# Patient Record
Sex: FEMALE | Race: OTHER | Employment: FULL TIME | URBAN - METROPOLITAN AREA
[De-identification: names, ages, dates, MRNs, and addresses within clinical notes are randomized per-mention and may not be internally consistent; named-entity substitution may affect disease eponyms.]

---

## 2024-11-20 SDOH — ECONOMIC STABILITY: FOOD INSECURITY: WITHIN THE PAST 12 MONTHS, YOU WORRIED THAT YOUR FOOD WOULD RUN OUT BEFORE YOU GOT MONEY TO BUY MORE.: NEVER TRUE

## 2024-11-20 SDOH — ECONOMIC STABILITY: FOOD INSECURITY: WITHIN THE PAST 12 MONTHS, THE FOOD YOU BOUGHT JUST DIDN'T LAST AND YOU DIDN'T HAVE MONEY TO GET MORE.: NEVER TRUE

## 2024-11-20 SDOH — ECONOMIC STABILITY: INCOME INSECURITY: HOW HARD IS IT FOR YOU TO PAY FOR THE VERY BASICS LIKE FOOD, HOUSING, MEDICAL CARE, AND HEATING?: NOT HARD AT ALL

## 2024-11-20 SDOH — ECONOMIC STABILITY: TRANSPORTATION INSECURITY
IN THE PAST 12 MONTHS, HAS LACK OF TRANSPORTATION KEPT YOU FROM MEETINGS, WORK, OR FROM GETTING THINGS NEEDED FOR DAILY LIVING?: NO

## 2024-11-20 ASSESSMENT — PATIENT HEALTH QUESTIONNAIRE - PHQ9
SUM OF ALL RESPONSES TO PHQ9 QUESTIONS 1 & 2: 0
2. FEELING DOWN, DEPRESSED OR HOPELESS: NOT AT ALL
2. FEELING DOWN, DEPRESSED OR HOPELESS: NOT AT ALL
1. LITTLE INTEREST OR PLEASURE IN DOING THINGS: NOT AT ALL
SUM OF ALL RESPONSES TO PHQ QUESTIONS 1-9: 0
SUM OF ALL RESPONSES TO PHQ9 QUESTIONS 1 & 2: 0
SUM OF ALL RESPONSES TO PHQ QUESTIONS 1-9: 0
1. LITTLE INTEREST OR PLEASURE IN DOING THINGS: NOT AT ALL

## 2024-11-21 ENCOUNTER — HOSPITAL ENCOUNTER (OUTPATIENT)
Dept: LAB | Age: 29
Discharge: HOME | End: 2024-11-21
Payer: COMMERCIAL

## 2024-11-21 ENCOUNTER — OFFICE VISIT (OUTPATIENT)
Dept: OBGYN CLINIC | Age: 29
End: 2024-11-21
Payer: COMMERCIAL

## 2024-11-21 VITALS
DIASTOLIC BLOOD PRESSURE: 60 MMHG | BODY MASS INDEX: 21.35 KG/M2 | HEART RATE: 84 BPM | WEIGHT: 116 LBS | SYSTOLIC BLOOD PRESSURE: 102 MMHG | HEIGHT: 62 IN

## 2024-11-21 DIAGNOSIS — Z32.01 POSITIVE URINE PREGNANCY TEST: ICD-10-CM

## 2024-11-21 DIAGNOSIS — E71.42: ICD-10-CM

## 2024-11-21 DIAGNOSIS — N91.1 SECONDARY AMENORRHEA: Primary | ICD-10-CM

## 2024-11-21 DIAGNOSIS — N91.1 SECONDARY AMENORRHEA: ICD-10-CM

## 2024-11-21 LAB
ALBUMIN SERPL-MCNC: 4.3 G/DL (ref 3.5–4.6)
ALP SERPL-CCNC: 50 U/L (ref 40–130)
ALT SERPL-CCNC: 9 U/L (ref 0–33)
ANION GAP SERPL CALCULATED.3IONS-SCNC: 9 MEQ/L (ref 9–15)
AST SERPL-CCNC: 11 U/L (ref 0–35)
BASOPHILS # BLD: 0 K/UL (ref 0–0.2)
BASOPHILS NFR BLD: 0.3 %
BILIRUB SERPL-MCNC: 0.3 MG/DL (ref 0.2–0.7)
BILIRUB UR QL STRIP: NEGATIVE
BUN SERPL-MCNC: 8 MG/DL (ref 6–20)
CALCIUM SERPL-MCNC: 9.3 MG/DL (ref 8.5–9.9)
CHLORIDE SERPL-SCNC: 103 MEQ/L (ref 95–107)
CLARITY UR: CLEAR
CO2 SERPL-SCNC: 26 MEQ/L (ref 20–31)
COLOR UR: YELLOW
CREAT SERPL-MCNC: 0.37 MG/DL (ref 0.5–0.9)
EOSINOPHIL # BLD: 0.1 K/UL (ref 0–0.7)
EOSINOPHIL NFR BLD: 0.7 %
ERYTHROCYTE [DISTWIDTH] IN BLOOD BY AUTOMATED COUNT: 12.7 % (ref 11.5–14.5)
GLOBULIN SER CALC-MCNC: 2.7 G/DL (ref 2.3–3.5)
GLUCOSE SERPL-MCNC: 79 MG/DL (ref 70–99)
GLUCOSE UR STRIP-MCNC: NEGATIVE MG/DL
HBV SURFACE AG SERPL QL IA: NORMAL
HCT VFR BLD AUTO: 34 % (ref 37–47)
HGB BLD-MCNC: 11.4 G/DL (ref 12–16)
HGB UR QL STRIP: NEGATIVE
KETONES UR STRIP-MCNC: NEGATIVE MG/DL
LEUKOCYTE ESTERASE UR QL STRIP: NEGATIVE
LYMPHOCYTES # BLD: 1.7 K/UL (ref 1–4.8)
LYMPHOCYTES NFR BLD: 24.9 %
MCH RBC QN AUTO: 31 PG (ref 27–31.3)
MCHC RBC AUTO-ENTMCNC: 33.5 % (ref 33–37)
MCV RBC AUTO: 92.4 FL (ref 79.4–94.8)
MONOCYTES # BLD: 0.4 K/UL (ref 0.2–0.8)
MONOCYTES NFR BLD: 5.5 %
NEUTROPHILS # BLD: 4.6 K/UL (ref 1.4–6.5)
NEUTS SEG NFR BLD: 68.2 %
NITRITE UR QL STRIP: NEGATIVE
PH UR STRIP: 7.5 [PH] (ref 5–9)
PLATELET # BLD AUTO: 314 K/UL (ref 130–400)
POTASSIUM SERPL-SCNC: 4.2 MEQ/L (ref 3.4–4.9)
PROT SERPL-MCNC: 7 G/DL (ref 6.3–8)
PROT UR STRIP-MCNC: NEGATIVE MG/DL
RBC # BLD AUTO: 3.68 M/UL (ref 4.2–5.4)
RUBELLA ANTIBODY IGG: 199.9 IU/ML
SODIUM SERPL-SCNC: 138 MEQ/L (ref 135–144)
SP GR UR STRIP: 1.02 (ref 1–1.03)
UROBILINOGEN UR STRIP-ACNC: 0.2 E.U./DL
WBC # BLD AUTO: 6.7 K/UL (ref 4.8–10.8)

## 2024-11-21 PROCEDURE — G8427 DOCREV CUR MEDS BY ELIG CLIN: HCPCS | Performed by: OBSTETRICS & GYNECOLOGY

## 2024-11-21 PROCEDURE — 80307 DRUG TEST PRSMV CHEM ANLYZR: CPT

## 2024-11-21 PROCEDURE — G8484 FLU IMMUNIZE NO ADMIN: HCPCS | Performed by: OBSTETRICS & GYNECOLOGY

## 2024-11-21 PROCEDURE — 1036F TOBACCO NON-USER: CPT | Performed by: OBSTETRICS & GYNECOLOGY

## 2024-11-21 PROCEDURE — 81025 URINE PREGNANCY TEST: CPT | Performed by: OBSTETRICS & GYNECOLOGY

## 2024-11-21 PROCEDURE — 81003 URINALYSIS AUTO W/O SCOPE: CPT

## 2024-11-21 PROCEDURE — 87808 TRICHOMONAS ASSAY W/OPTIC: CPT

## 2024-11-21 PROCEDURE — G8420 CALC BMI NORM PARAMETERS: HCPCS | Performed by: OBSTETRICS & GYNECOLOGY

## 2024-11-21 PROCEDURE — 99214 OFFICE O/P EST MOD 30 MIN: CPT | Performed by: OBSTETRICS & GYNECOLOGY

## 2024-11-21 PROCEDURE — 87086 URINE CULTURE/COLONY COUNT: CPT

## 2024-11-21 PROCEDURE — 87210 SMEAR WET MOUNT SALINE/INK: CPT

## 2024-11-21 RX ORDER — DOCUSATE SODIUM 100 MG/1
100 CAPSULE, LIQUID FILLED ORAL 2 TIMES DAILY
Qty: 60 CAPSULE | Refills: 4 | Status: SHIPPED | OUTPATIENT
Start: 2024-11-21

## 2024-11-21 ASSESSMENT — ENCOUNTER SYMPTOMS
NAUSEA: 0
ABDOMINAL DISTENTION: 0
ABDOMINAL PAIN: 0
BLOOD IN STOOL: 0
CONSTIPATION: 1
SHORTNESS OF BREATH: 0
WHEEZING: 0
SORE THROAT: 0
COUGH: 0
VOMITING: 0
DIARRHEA: 0

## 2024-11-21 NOTE — PROGRESS NOTES
Subjective:      Patient ID:  Alondra Myers is a 29 y.o. female with chief complaint of:  Chief Complaint   Patient presents with    Amenorrhea     LMP approximately 9/21/24.       Patient presents today to establish care positive pregnancy test at home last menstrual period September 21.  Patient has 2 previous vaginal deliveries without complication she has a known history of carnitine deficiency and she is taking supplement.        Past Medical History:   Diagnosis Date    Carnitine deficiency (HCC)      No past surgical history on file.  Family History   Problem Relation Age of Onset    Asthma Mother     Hypertension Father     Diabetes Maternal Grandmother     Hypertension Maternal Grandmother     Diabetes Paternal Grandmother      Current Outpatient Medications on File Prior to Visit   Medication Sig Dispense Refill    levOCARNitine (CARNITOR) 330 MG tablet TAKE 1 TABLET BY MOUTH TWICE A DAY      Prenatal Vit-DSS-Fe Cbn-FA (PRENATAL AD PO) Take by mouth       No current facility-administered medications on file prior to visit.     Allergies:  Patient has no known allergies.    Review of Systems   Constitutional:  Negative for activity change, appetite change, fatigue and unexpected weight change.   HENT:  Negative for nosebleeds and sore throat.    Eyes:  Negative for visual disturbance.   Respiratory:  Negative for cough, shortness of breath and wheezing.    Cardiovascular:  Negative for chest pain, palpitations and leg swelling.   Gastrointestinal:  Positive for constipation. Negative for abdominal distention, abdominal pain, blood in stool, diarrhea, nausea and vomiting.   Endocrine: Negative for cold intolerance, heat intolerance, polydipsia and polyuria.   Genitourinary:  Negative for difficulty urinating, dyspareunia, dysuria, frequency, genital sores, hematuria, menstrual problem, pelvic pain, urgency, vaginal bleeding, vaginal discharge and vaginal pain.   Musculoskeletal:  Negative for arthralgias.

## 2024-11-22 LAB
ABO/RH: NORMAL
ANTIBODY SCREEN: NORMAL
CLUE CELLS VAG QL WET PREP: NORMAL
HEPATITIS C ANTIBODY: NONREACTIVE
HIV AG/AB: NONREACTIVE
HPV16+18+H RISK 12 DNA SPEC-IMP: NORMAL
RPR SER QL: NORMAL
T VAGINALIS VAG QL WET PREP: NORMAL
TRICHOMONAS VAGINALIS SCREEN: NEGATIVE
YEAST VAG QL WET PREP: NORMAL

## 2024-11-23 LAB
BACTERIA UR CULT: NORMAL
HSV1 GG IGG SER-ACNC: 33.2 IV
HSV2 GG IGG SER-ACNC: 0.1 IV
VZV IGG SER IA-ACNC: 3.2 S/CO

## 2024-11-24 LAB
ACYLCARNITINE SERPL-SCNC: 7 UMOL/L (ref 5–29)
CARN ESTERS/C0 SERPL-SRTO: 0.3 {RATIO} (ref 0.1–1)
CARNITINE FREE SERPL-SCNC: 21 UMOL/L (ref 25–60)
CARNITINE SERPL-SCNC: 28 UMOL/L (ref 34–86)

## 2024-11-25 LAB
C TRACH DNA CVX QL NAA+PROBE: NEGATIVE
N GONORRHOEA DNA CERV MUCUS QL NAA+PROBE: NEGATIVE

## 2024-11-27 LAB
6MAM UR QL: NOT DETECTED
7-AMINOCLONAZEPAM: NOT DETECTED
ALPHA-OH-ALPRAZOLAM: NOT DETECTED
ALPHA-OH-MIDAZOLAM, URINE: NOT DETECTED
ALPRAZOLAM: NOT DETECTED
AMPHET UR QL SCN: NOT DETECTED
BARBITURATES: NEGATIVE
BENZOYLECGONINE: NEGATIVE
BUPRENORPHINE: NOT DETECTED
CARISOPRODOL UR QL: NEGATIVE
CLONAZEPAM UR QL: NOT DETECTED
CODEINE: NOT DETECTED
CREAT UR-MCNC: 115 MG/DL (ref 20–400)
DIAZEPAM: NOT DETECTED
ETHYL GLUCURONIDE: NEGATIVE
FENTANYL UR QL: NOT DETECTED
GABAPENTIN: NOT DETECTED
HPV HR 12 DNA SPEC QL NAA+PROBE: NOT DETECTED
HPV16 DNA SPEC QL NAA+PROBE: NOT DETECTED
HPV16+18+H RISK 12 DNA SPEC-IMP: NORMAL
HPV18 DNA SPEC QL NAA+PROBE: NOT DETECTED
HYDROCODONE UR QL: NOT DETECTED
HYDROMORPHONE: NOT DETECTED
LORAZEPAM UR QL: NOT DETECTED
MARIJUANA METABOLITE: NEGATIVE
MDA: NOT DETECTED
MDEA: NOT DETECTED
MDMA UR QL: NOT DETECTED
MEPERIDINE: NOT DETECTED
METHADONE: NEGATIVE
METHAMPHETAMINE: NOT DETECTED
METHYLPHENIDATE: NOT DETECTED
MIDAZOLAM UR QL SCN: NOT DETECTED
MORPHINE: NOT DETECTED
NALOXONE: NOT DETECTED
NORBUPRENORPHINE, FREE: NOT DETECTED
NORDIAZEPAM: NOT DETECTED
NORFENTANYL: NOT DETECTED
NORHYDROCODONE, URINE: NOT DETECTED
NOROXYCODONE: NOT DETECTED
NOROXYMORPHONE, URINE: NOT DETECTED
OXAZEPAM UR QL: NOT DETECTED
OXYCODONE UR QL: NOT DETECTED
OXYMORPHONE UR QL: NOT DETECTED
PAIN MANAGEMENT DRUG PANEL: NORMAL
PATHOLOGY STUDY: NORMAL
PCP: NEGATIVE
PHENTERMINE: NOT DETECTED
PREGABALIN: NOT DETECTED
TAPENTADOL, URINE: NOT DETECTED
TAPENTADOL-O-SULFATE, URINE: NOT DETECTED
TEMAZEPAM: NOT DETECTED
TRAMADOL: NEGATIVE
ZOLPIDEM: NOT DETECTED

## 2024-11-29 ENCOUNTER — TELEPHONE (OUTPATIENT)
Dept: OBGYN CLINIC | Age: 29
End: 2024-11-29

## 2024-11-29 NOTE — TELEPHONE ENCOUNTER
----- Message from Dr. Parish Woodward DO sent at 11/25/2024  2:55 PM EST -----  Please have pt share with her doctor so they may adjust her meds  ----- Message -----  From: Valentino Rodriguez Incoming Lab Results From [facility]  Sent: 11/21/2024   3:32 PM EST  To: Parish Woodward DO

## 2024-11-29 NOTE — TELEPHONE ENCOUNTER
LMOM informing patient to contact her PCP in regards to her labs due to medication may need to be adjusted.

## 2024-12-02 ENCOUNTER — HOSPITAL ENCOUNTER (OUTPATIENT)
Dept: ULTRASOUND IMAGING | Age: 29
Discharge: HOME | End: 2024-12-04
Payer: COMMERCIAL

## 2024-12-02 DIAGNOSIS — N91.1 SECONDARY AMENORRHEA: ICD-10-CM

## 2024-12-02 DIAGNOSIS — Z32.01 POSITIVE URINE PREGNANCY TEST: ICD-10-CM

## 2024-12-02 PROCEDURE — 76801 OB US < 14 WKS SINGLE FETUS: CPT

## 2024-12-02 PROCEDURE — 76817 TRANSVAGINAL US OBSTETRIC: CPT

## 2024-12-18 ENCOUNTER — INITIAL PRENATAL (OUTPATIENT)
Dept: OBGYN CLINIC | Age: 29
End: 2024-12-18
Payer: COMMERCIAL

## 2024-12-18 VITALS
SYSTOLIC BLOOD PRESSURE: 102 MMHG | BODY MASS INDEX: 21.4 KG/M2 | WEIGHT: 117 LBS | DIASTOLIC BLOOD PRESSURE: 62 MMHG | HEART RATE: 92 BPM

## 2024-12-18 DIAGNOSIS — Z3A.12 12 WEEKS GESTATION OF PREGNANCY: ICD-10-CM

## 2024-12-18 DIAGNOSIS — E71.40 CARNITINE DEFICIENCY (HCC): ICD-10-CM

## 2024-12-18 DIAGNOSIS — Z34.91 PRENATAL CARE, FIRST TRIMESTER: Primary | ICD-10-CM

## 2024-12-18 DIAGNOSIS — Z34.82 ENCOUNTER FOR SUPERVISION OF OTHER NORMAL PREGNANCY, SECOND TRIMESTER: ICD-10-CM

## 2024-12-18 PROCEDURE — G8484 FLU IMMUNIZE NO ADMIN: HCPCS | Performed by: OBSTETRICS & GYNECOLOGY

## 2024-12-18 PROCEDURE — 1036F TOBACCO NON-USER: CPT | Performed by: OBSTETRICS & GYNECOLOGY

## 2024-12-18 PROCEDURE — G8420 CALC BMI NORM PARAMETERS: HCPCS | Performed by: OBSTETRICS & GYNECOLOGY

## 2024-12-18 PROCEDURE — 99213 OFFICE O/P EST LOW 20 MIN: CPT | Performed by: OBSTETRICS & GYNECOLOGY

## 2024-12-18 PROCEDURE — G8427 DOCREV CUR MEDS BY ELIG CLIN: HCPCS | Performed by: OBSTETRICS & GYNECOLOGY

## 2024-12-18 NOTE — PROGRESS NOTES
using tobacco. Morbidity, mortality, and cessation programs were reviewed. The risks include but are not limited to increased risks of  labor,  delivery, premature rupture of membranes, intrauterine growth restriction, intrauterine fetal demise and abruptio placenta. Secondary smoke risks were also reviewed. Increases in cancer, respiratory problems, and sudden infant death syndrome were reviewed as well.    The patient was informed of a 2-4% risk of congenital anomalies in the general population. She was also informed that karyotyping is the only way to evaluate the fetus for genetic problems and genetic lethal anomalies. Chorionic villous sampling, amniocentesis and Maternal Genetic Blood Sampling-(NIPT Testing) were also discussed with morbidity rates in detail. She requested any of the options.    Route of delivery and counseling on vaginal, operative vaginal, and  sections were completed with the risks of each to both the patient as well as her baby. The possibility of a blood transfusion was discussed as well. The patient was not opposed to receiving a transfusion if needed.    First trimester screening and MSAFP single marker testing was reviewed in detail with attention to timing of testing and their windows.A second trimester amniocentesis with MFM consults is also made available to the patient with significant risks. Risks, Benefits and non-invasive alternative testing was reviewed.     The patient was questioned in detail regarding any genetic misnomer history, chromosomal abnormalities, or learning disabilities in  herself, the father of the baby or their families. SHE DENIED ANY HISTORY AS STATED ABOVE: Yes    Upon completion of the visit all questions were answered and the patients follow-up and testing schedule were reviewed.  Prenatal vitamins were given if necessary.  What to expect from visits and care discussed     Initial labs reviewed  Prenatal vitamins.  Problem list

## 2025-01-20 SDOH — ECONOMIC STABILITY: TRANSPORTATION INSECURITY
IN THE PAST 12 MONTHS, HAS THE LACK OF TRANSPORTATION KEPT YOU FROM MEDICAL APPOINTMENTS OR FROM GETTING MEDICATIONS?: NO

## 2025-01-20 SDOH — ECONOMIC STABILITY: INCOME INSECURITY: IN THE LAST 12 MONTHS, WAS THERE A TIME WHEN YOU WERE NOT ABLE TO PAY THE MORTGAGE OR RENT ON TIME?: NO

## 2025-01-20 SDOH — ECONOMIC STABILITY: FOOD INSECURITY: WITHIN THE PAST 12 MONTHS, THE FOOD YOU BOUGHT JUST DIDN'T LAST AND YOU DIDN'T HAVE MONEY TO GET MORE.: NEVER TRUE

## 2025-01-20 SDOH — ECONOMIC STABILITY: FOOD INSECURITY: WITHIN THE PAST 12 MONTHS, YOU WORRIED THAT YOUR FOOD WOULD RUN OUT BEFORE YOU GOT MONEY TO BUY MORE.: NEVER TRUE

## 2025-01-23 ENCOUNTER — ROUTINE PRENATAL (OUTPATIENT)
Dept: OBGYN CLINIC | Age: 30
End: 2025-01-23

## 2025-01-23 VITALS
SYSTOLIC BLOOD PRESSURE: 104 MMHG | HEIGHT: 62 IN | BODY MASS INDEX: 22.63 KG/M2 | WEIGHT: 123 LBS | DIASTOLIC BLOOD PRESSURE: 72 MMHG

## 2025-01-23 DIAGNOSIS — Z34.80 SUPERVISION OF OTHER NORMAL PREGNANCY, ANTEPARTUM: Primary | ICD-10-CM

## 2025-01-23 DIAGNOSIS — Z3A.17 17 WEEKS GESTATION OF PREGNANCY: ICD-10-CM

## 2025-01-23 DIAGNOSIS — E71.40 CARNITINE DEFICIENCY (HCC): ICD-10-CM

## 2025-01-23 ASSESSMENT — PATIENT HEALTH QUESTIONNAIRE - PHQ9
SUM OF ALL RESPONSES TO PHQ QUESTIONS 1-9: 0
SUM OF ALL RESPONSES TO PHQ9 QUESTIONS 1 & 2: 0
SUM OF ALL RESPONSES TO PHQ QUESTIONS 1-9: 0
SUM OF ALL RESPONSES TO PHQ QUESTIONS 1-9: 0
1. LITTLE INTEREST OR PLEASURE IN DOING THINGS: NOT AT ALL
SUM OF ALL RESPONSES TO PHQ QUESTIONS 1-9: 0
2. FEELING DOWN, DEPRESSED OR HOPELESS: NOT AT ALL

## 2025-01-23 NOTE — PROGRESS NOTES
Patient's last menstrual period was 09/21/2024 (approximate).  Estimated Date of Delivery: 6/28/25   Please reference prenatal and OB flow chart for further information  PT here today for routine prenatal care  Pt endorses fetal movement and denies loss of fluid, contractions or vaginal bleeding  Pt without complaints  ROS:  Pt denies headache, dysuria, nausea/vomiting  PT denies chest pain or SOB  PE:  /72   Ht 1.575 m (5' 2\")   Wt 55.8 kg (123 lb)   LMP 09/21/2024 (Approximate)   Breastfeeding No   BMI 22.50 kg/m²   Gen - Alert and oriented x 3  HEENT- NC/AT, CVS - RRR, Lungs - CTAB  Abd - FH Appropriate fetal growth  LE no edema  Reassuring fetal status at this time     Diagnosis Orders   1. Supervision of other normal pregnancy, antepartum        2. 17 weeks gestation of pregnancy        3. Carnitine deficiency (HCC)            Upon completion of the visit all questions were answered and the patients follow-up and testing schedule were reviewed.  Pt recommended to continue pnvit and iron if ordered along with other supporting meds  Spent 20-25 min total time with pt

## 2025-02-10 ENCOUNTER — HOSPITAL ENCOUNTER (OUTPATIENT)
Dept: ULTRASOUND IMAGING | Age: 30
Discharge: HOME | End: 2025-02-12
Payer: COMMERCIAL

## 2025-02-10 DIAGNOSIS — Z3A.17 17 WEEKS GESTATION OF PREGNANCY: ICD-10-CM

## 2025-02-10 DIAGNOSIS — Z34.80 SUPERVISION OF OTHER NORMAL PREGNANCY, ANTEPARTUM: ICD-10-CM

## 2025-02-10 PROCEDURE — 76805 OB US >/= 14 WKS SNGL FETUS: CPT

## 2025-02-10 PROCEDURE — 76817 TRANSVAGINAL US OBSTETRIC: CPT

## 2025-02-20 ENCOUNTER — ROUTINE PRENATAL (OUTPATIENT)
Dept: OBGYN CLINIC | Age: 30
End: 2025-02-20
Payer: COMMERCIAL

## 2025-02-20 VITALS — BODY MASS INDEX: 23.78 KG/M2 | DIASTOLIC BLOOD PRESSURE: 72 MMHG | SYSTOLIC BLOOD PRESSURE: 106 MMHG | WEIGHT: 130 LBS

## 2025-02-20 DIAGNOSIS — Z3A.21 21 WEEKS GESTATION OF PREGNANCY: ICD-10-CM

## 2025-02-20 DIAGNOSIS — Z34.80 SUPERVISION OF OTHER NORMAL PREGNANCY, ANTEPARTUM: Primary | ICD-10-CM

## 2025-02-20 DIAGNOSIS — E71.40 CARNITINE DEFICIENCY: ICD-10-CM

## 2025-02-20 PROCEDURE — 1036F TOBACCO NON-USER: CPT | Performed by: OBSTETRICS & GYNECOLOGY

## 2025-02-20 PROCEDURE — G8420 CALC BMI NORM PARAMETERS: HCPCS | Performed by: OBSTETRICS & GYNECOLOGY

## 2025-02-20 PROCEDURE — 99213 OFFICE O/P EST LOW 20 MIN: CPT | Performed by: OBSTETRICS & GYNECOLOGY

## 2025-02-20 PROCEDURE — G8427 DOCREV CUR MEDS BY ELIG CLIN: HCPCS | Performed by: OBSTETRICS & GYNECOLOGY

## 2025-02-20 NOTE — PROGRESS NOTES
Patient's last menstrual period was 09/21/2024 (approximate).  Estimated Date of Delivery: 6/28/25   Please reference prenatal and OB flow chart for further information  PT here today for routine prenatal care  Pt endorses fetal movement and denies loss of fluid, contractions or vaginal bleeding  Pt without complaints  ROS:  Pt denies headache, dysuria, nausea/vomiting  PT denies chest pain or SOB  PE:  /72   Wt 59 kg (130 lb)   LMP 09/21/2024 (Approximate)   BMI 23.78 kg/m²   Gen - Alert and oriented x 3  HEENT- NC/AT, CVS - RRR, Lungs - CTAB  Abd - FH Appropriate fetal growth  LE no edema  Reassuring fetal status at this time     Diagnosis Orders   1. Supervision of other normal pregnancy, antepartum        2. 21 weeks gestation of pregnancy        3. Carnitine deficiency            Upon completion of the visit all questions were answered and the patients follow-up and testing schedule were reviewed.  Pt recommended to continue pnvit and iron if ordered along with other supporting meds  Spent 20-25 min total time with pt

## 2025-03-11 ENCOUNTER — TELEPHONE (OUTPATIENT)
Dept: OBGYN CLINIC | Age: 30
End: 2025-03-11

## 2025-03-12 ENCOUNTER — OFFICE VISIT (OUTPATIENT)
Dept: FAMILY MEDICINE CLINIC | Age: 30
End: 2025-03-12
Payer: COMMERCIAL

## 2025-03-12 VITALS
BODY MASS INDEX: 23.92 KG/M2 | HEIGHT: 62 IN | WEIGHT: 130 LBS | TEMPERATURE: 99.5 F | OXYGEN SATURATION: 99 % | HEART RATE: 104 BPM

## 2025-03-12 DIAGNOSIS — R11.0 NAUSEA: ICD-10-CM

## 2025-03-12 DIAGNOSIS — R52 GENERALIZED BODY ACHES: ICD-10-CM

## 2025-03-12 DIAGNOSIS — R53.83 FATIGUE, UNSPECIFIED TYPE: ICD-10-CM

## 2025-03-12 DIAGNOSIS — J02.9 ACUTE PHARYNGITIS, UNSPECIFIED ETIOLOGY: ICD-10-CM

## 2025-03-12 DIAGNOSIS — R50.9 FEVER, UNSPECIFIED FEVER CAUSE: ICD-10-CM

## 2025-03-12 DIAGNOSIS — J01.40 ACUTE NON-RECURRENT PANSINUSITIS: Primary | ICD-10-CM

## 2025-03-12 LAB
INFLUENZA A ANTIBODY: NORMAL
INFLUENZA B ANTIBODY: NORMAL
Lab: NORMAL
PERFORMING INSTRUMENT: NORMAL
QC PASS/FAIL: NORMAL
SARS-COV-2, POC: NORMAL

## 2025-03-12 PROCEDURE — 87804 INFLUENZA ASSAY W/OPTIC: CPT | Performed by: NURSE PRACTITIONER

## 2025-03-12 PROCEDURE — G8427 DOCREV CUR MEDS BY ELIG CLIN: HCPCS | Performed by: NURSE PRACTITIONER

## 2025-03-12 PROCEDURE — 99213 OFFICE O/P EST LOW 20 MIN: CPT | Performed by: NURSE PRACTITIONER

## 2025-03-12 PROCEDURE — 1036F TOBACCO NON-USER: CPT | Performed by: NURSE PRACTITIONER

## 2025-03-12 PROCEDURE — 87426 SARSCOV CORONAVIRUS AG IA: CPT | Performed by: NURSE PRACTITIONER

## 2025-03-12 PROCEDURE — G8420 CALC BMI NORM PARAMETERS: HCPCS | Performed by: NURSE PRACTITIONER

## 2025-03-12 RX ORDER — ONDANSETRON 4 MG/1
4 TABLET, ORALLY DISINTEGRATING ORAL
Qty: 21 TABLET | Refills: 0 | Status: SHIPPED | OUTPATIENT
Start: 2025-03-12

## 2025-03-12 RX ORDER — AMOXICILLIN 875 MG/1
875 TABLET, COATED ORAL 2 TIMES DAILY
Qty: 14 TABLET | Refills: 0 | Status: SHIPPED | OUTPATIENT
Start: 2025-03-12 | End: 2025-03-19

## 2025-03-12 ASSESSMENT — ENCOUNTER SYMPTOMS
ABDOMINAL PAIN: 0
NAUSEA: 1
DIARRHEA: 0
CHEST TIGHTNESS: 0
RHINORRHEA: 1
COUGH: 1
SORE THROAT: 1
SHORTNESS OF BREATH: 0

## 2025-03-12 NOTE — PROGRESS NOTES
Subjective  CARLA FLYNN TOM, 29 y.o. female presents today with:  Chief Complaint   Patient presents with    Cold Symptoms     States sore throat, headache, a lot of congestion and ear pain, eye pain, weak, nauseous, coughing with a phlegm, fever 101.5 and body aches since Saturday. Last tylenol taken 3:00am       HPI  Presents to walk-in clinic for acute illness   Symptoms began Saturday   Fever began Monday evening continued last night. Took Tylenol last around 03:00  C/o sore throat, headache, nasal congestion, otalgia, cough & body aches   Green sputum nasal/sinus   Feeling dizzy/lightheaded   Fatigued   Nausea w/o emesis   Denies diarrhea   Low appetite   Hydrating   Sleep interrupted   Cepacol lozenge   Currently pregnant   No other ill contacts in household                   Past Medical History:   Diagnosis Date    Carnitine deficiency       No past surgical history on file.  Family History   Problem Relation Age of Onset    Asthma Mother     Hypertension Father     Diabetes Maternal Grandmother     Hypertension Maternal Grandmother     Diabetes Paternal Grandmother            Review of Systems   Constitutional:  Positive for activity change, appetite change, chills, diaphoresis, fatigue and fever.   HENT:  Positive for congestion, ear pain, rhinorrhea and sore throat. Negative for ear discharge.    Respiratory:  Positive for cough. Negative for chest tightness and shortness of breath.    Gastrointestinal:  Positive for nausea. Negative for abdominal pain and diarrhea.   Musculoskeletal:  Positive for myalgias.   Neurological:  Positive for dizziness, light-headedness and headaches.   Psychiatric/Behavioral:  Positive for sleep disturbance.            PMH, Surgical Hx, Family Hx, and Social Hx reviewed and updated.  Health Maintenance reviewed.            Objective  Vitals:    03/12/25 1102   Pulse: (!) 104   Temp: 99.5 °F (37.5 °C)   SpO2: 99%   Weight: 59 kg (130 lb)   Height: 1.575 m (5' 2\")

## 2025-03-21 ENCOUNTER — ROUTINE PRENATAL (OUTPATIENT)
Dept: OBGYN CLINIC | Age: 30
End: 2025-03-21

## 2025-03-21 VITALS — WEIGHT: 130 LBS | SYSTOLIC BLOOD PRESSURE: 108 MMHG | BODY MASS INDEX: 23.78 KG/M2 | DIASTOLIC BLOOD PRESSURE: 68 MMHG

## 2025-03-21 DIAGNOSIS — Z3A.25 25 WEEKS GESTATION OF PREGNANCY: ICD-10-CM

## 2025-03-21 DIAGNOSIS — Z34.80 SUPERVISION OF OTHER NORMAL PREGNANCY, ANTEPARTUM: Primary | ICD-10-CM

## 2025-03-21 NOTE — PROGRESS NOTES
Patient's last menstrual period was 09/21/2024 (approximate).  Estimated Date of Delivery: 6/28/25   Please reference prenatal and OB flow chart for further information  PT here today for routine prenatal care  Pt endorses fetal movement and denies loss of fluid, contractions or vaginal bleeding  Pt without complaints  ROS:  Pt denies headache, dysuria, nausea/vomiting  PT denies chest pain or SOB  PE:  /68   Wt 59 kg (130 lb)   LMP 09/21/2024 (Approximate)   BMI 23.78 kg/m²   Gen - Alert and oriented x 3  HEENT- NC/AT, CVS - RRR, Lungs - CTAB  Abd - FH Appropriate fetal growth  LE no edema  Reassuring fetal status at this time     Diagnosis Orders   1. Supervision of other normal pregnancy, antepartum  Glucose Tolerance, 1 Hour    T Pallidum Screen W/Reflex    CBC with Auto Differential      2. 25 weeks gestation of pregnancy  Glucose Tolerance, 1 Hour    T Pallidum Screen W/Reflex    CBC with Auto Differential          Upon completion of the visit all questions were answered and the patients follow-up and testing schedule were reviewed.  Pt recommended to continue pnvit and iron if ordered along with other supporting meds  Spent 20-25 min total time with pt

## 2025-03-31 DIAGNOSIS — Z3A.25 25 WEEKS GESTATION OF PREGNANCY: ICD-10-CM

## 2025-03-31 DIAGNOSIS — Z34.80 SUPERVISION OF OTHER NORMAL PREGNANCY, ANTEPARTUM: ICD-10-CM

## 2025-03-31 LAB
BASOPHILS # BLD: 0 K/UL (ref 0–0.2)
BASOPHILS NFR BLD: 0.4 %
EOSINOPHIL # BLD: 0.1 K/UL (ref 0–0.7)
EOSINOPHIL NFR BLD: 0.7 %
ERYTHROCYTE [DISTWIDTH] IN BLOOD BY AUTOMATED COUNT: 13.8 % (ref 11.5–14.5)
GLUCOSE, 1HR PP: 160 MG/DL (ref 60–140)
HCT VFR BLD AUTO: 30 % (ref 37–47)
HGB BLD-MCNC: 9.8 G/DL (ref 12–16)
LYMPHOCYTES # BLD: 1.7 K/UL (ref 1–4.8)
LYMPHOCYTES NFR BLD: 23.7 %
MCH RBC QN AUTO: 31.8 PG (ref 27–31.3)
MCHC RBC AUTO-ENTMCNC: 32.7 % (ref 33–37)
MCV RBC AUTO: 97.4 FL (ref 79.4–94.8)
MONOCYTES # BLD: 0.3 K/UL (ref 0.2–0.8)
MONOCYTES NFR BLD: 4.9 %
NEUTROPHILS # BLD: 4.6 K/UL (ref 1.4–6.5)
NEUTS SEG NFR BLD: 66.4 %
PLATELET # BLD AUTO: 415 K/UL (ref 130–400)
RBC # BLD AUTO: 3.08 M/UL (ref 4.2–5.4)
REAGIN+T PALLIDUM IGG+IGM SERPL-IMP: NORMAL
WBC # BLD AUTO: 7 K/UL (ref 4.8–10.8)

## 2025-03-31 RX ORDER — FERROUS SULFATE 325(65) MG
325 TABLET ORAL 2 TIMES DAILY
Qty: 60 TABLET | Refills: 3 | Status: SHIPPED | OUTPATIENT
Start: 2025-03-31

## 2025-04-04 ENCOUNTER — RESULTS FOLLOW-UP (OUTPATIENT)
Dept: OBGYN CLINIC | Age: 30
End: 2025-04-04

## 2025-04-04 DIAGNOSIS — O99.810 ABNORMAL GLUCOSE TOLERANCE IN PREGNANCY: Primary | ICD-10-CM

## 2025-04-22 ENCOUNTER — ROUTINE PRENATAL (OUTPATIENT)
Dept: OBGYN CLINIC | Age: 30
End: 2025-04-22
Payer: COMMERCIAL

## 2025-04-22 VITALS
WEIGHT: 135 LBS | BODY MASS INDEX: 24.69 KG/M2 | DIASTOLIC BLOOD PRESSURE: 54 MMHG | SYSTOLIC BLOOD PRESSURE: 112 MMHG | HEART RATE: 94 BPM

## 2025-04-22 DIAGNOSIS — Z23 NEED FOR DIPHTHERIA-TETANUS-PERTUSSIS (TDAP) VACCINE: ICD-10-CM

## 2025-04-22 DIAGNOSIS — Z3A.30 30 WEEKS GESTATION OF PREGNANCY: ICD-10-CM

## 2025-04-22 DIAGNOSIS — Z34.83 PRENATAL CARE, SUBSEQUENT PREGNANCY IN THIRD TRIMESTER: Primary | ICD-10-CM

## 2025-04-22 PROCEDURE — 99213 OFFICE O/P EST LOW 20 MIN: CPT | Performed by: OBSTETRICS & GYNECOLOGY

## 2025-04-22 PROCEDURE — G8420 CALC BMI NORM PARAMETERS: HCPCS | Performed by: OBSTETRICS & GYNECOLOGY

## 2025-04-22 PROCEDURE — 90471 IMMUNIZATION ADMIN: CPT | Performed by: OBSTETRICS & GYNECOLOGY

## 2025-04-22 PROCEDURE — G8427 DOCREV CUR MEDS BY ELIG CLIN: HCPCS | Performed by: OBSTETRICS & GYNECOLOGY

## 2025-04-22 PROCEDURE — 1036F TOBACCO NON-USER: CPT | Performed by: OBSTETRICS & GYNECOLOGY

## 2025-04-22 PROCEDURE — 90715 TDAP VACCINE 7 YRS/> IM: CPT | Performed by: OBSTETRICS & GYNECOLOGY

## 2025-04-22 NOTE — PROGRESS NOTES
Patient's last menstrual period was 09/21/2024 (approximate).  Estimated Date of Delivery: 6/28/25   Please reference prenatal and OB flow chart for further information  PT here today for routine prenatal care  Pt endorses fetal movement and denies loss of fluid, contractions or vaginal bleeding  Pt without complaints  ROS:  Pt denies headache, dysuria, nausea/vomiting  PT denies chest pain or SOB  PE:  BP (!) 112/54   Pulse 94   Wt 61.2 kg (135 lb)   LMP 09/21/2024 (Approximate)   BMI 24.69 kg/m²   Gen - Alert and oriented x 3  HEENT- NC/AT, CVS - RRR, Lungs - CTAB  Abd - FH Appropriate fetal growth  LE no edema  Reassuring fetal status at this time     Diagnosis Orders   1. Prenatal care, subsequent pregnancy in third trimester  US OB 1 OR MORE FETUS LIMITED      2. 30 weeks gestation of pregnancy  US OB 1 OR MORE FETUS LIMITED      3. Need for diphtheria-tetanus-pertussis (Tdap) vaccine  Tdap, BOOSTRIX, (age 10 yrs+), IM          Upon completion of the visit all questions were answered and the patients follow-up and testing schedule were reviewed.  Pt recommended to continue pnvit and iron if ordered along with other supporting meds  Spent 20-25 min total time with pt

## 2025-05-12 ENCOUNTER — OFFICE VISIT (OUTPATIENT)
Age: 30
End: 2025-05-12
Payer: COMMERCIAL

## 2025-05-12 VITALS
TEMPERATURE: 98.6 F | OXYGEN SATURATION: 99 % | DIASTOLIC BLOOD PRESSURE: 60 MMHG | WEIGHT: 132 LBS | BODY MASS INDEX: 24.29 KG/M2 | HEIGHT: 62 IN | SYSTOLIC BLOOD PRESSURE: 92 MMHG | HEART RATE: 94 BPM

## 2025-05-12 DIAGNOSIS — A08.4 VIRAL GASTROENTERITIS: Primary | ICD-10-CM

## 2025-05-12 PROCEDURE — 99212 OFFICE O/P EST SF 10 MIN: CPT | Performed by: NURSE PRACTITIONER

## 2025-05-12 PROCEDURE — G8420 CALC BMI NORM PARAMETERS: HCPCS | Performed by: NURSE PRACTITIONER

## 2025-05-12 PROCEDURE — 99213 OFFICE O/P EST LOW 20 MIN: CPT | Performed by: NURSE PRACTITIONER

## 2025-05-12 PROCEDURE — 1036F TOBACCO NON-USER: CPT | Performed by: NURSE PRACTITIONER

## 2025-05-12 PROCEDURE — G8427 DOCREV CUR MEDS BY ELIG CLIN: HCPCS | Performed by: NURSE PRACTITIONER

## 2025-05-12 RX ORDER — ONDANSETRON 4 MG/1
4 TABLET, ORALLY DISINTEGRATING ORAL EVERY 8 HOURS PRN
Qty: 9 TABLET | Refills: 0 | Status: SHIPPED | OUTPATIENT
Start: 2025-05-12 | End: 2025-05-15

## 2025-05-12 NOTE — PROGRESS NOTES
Subjective:      Patient ID: CARLA SANTOS is a 30 y.o. female who presents today for:  Chief Complaint   Patient presents with    GI Problem     Pt states concern for a stomach bug since Thursday.   Nausea, stomach pain, loss of appetite, diarrhea.          HPI  Patient is here with  Says it started with diarrhea on Thursday.   Says she also had diarrhea on Saturday.  Says she is lightheaded dizzy, nausea,.  Says she is worried about dehydration.   Says she is 33 weeks pregnant.   Says she has nothing for the nausea at home.  Says she is drying to drink water and electrolyte water.   Says she has not been eating much.  Denies any fever.  Says she is urinating 2-3 times a day.   Past Medical History:   Diagnosis Date    Carnitine deficiency      No past surgical history on file.  Social History     Socioeconomic History    Marital status:      Spouse name: Not on file    Number of children: Not on file    Years of education: Not on file    Highest education level: Not on file   Occupational History    Not on file   Tobacco Use    Smoking status: Never    Smokeless tobacco: Never   Vaping Use    Vaping status: Never Used   Substance and Sexual Activity    Alcohol use: Not Currently     Alcohol/week: 1.0 standard drink of alcohol     Types: 1 Cans of beer per week    Drug use: Never    Sexual activity: Yes     Partners: Male   Other Topics Concern    Not on file   Social History Narrative    Not on file     Social Drivers of Health     Financial Resource Strain: Low Risk  (2/25/2025)    Received from [facility]    Overall Financial Resource Strain (CARDIA)     Difficulty of Paying Living Expenses: Not very hard   Food Insecurity: No Food Insecurity (2/25/2025)    Received from [facility]    Hunger Vital Sign     Worried About Running Out of Food in the Last Year: Never true     Ran Out of Food in the Last Year: Never true   Transportation Needs: No Transportation Needs (2/25/2025)

## 2025-05-14 ASSESSMENT — ENCOUNTER SYMPTOMS
CHEST TIGHTNESS: 0
SHORTNESS OF BREATH: 0
NAUSEA: 1
DIARRHEA: 1
RHINORRHEA: 0
BLOOD IN STOOL: 0
EYE PAIN: 0
VOICE CHANGE: 0
CONSTIPATION: 0
ANAL BLEEDING: 0
COUGH: 0
PHOTOPHOBIA: 0
EYE ITCHING: 0
EYE DISCHARGE: 0
ABDOMINAL PAIN: 0
SINUS PRESSURE: 0
ABDOMINAL DISTENTION: 0
EYE REDNESS: 0
RECTAL PAIN: 0
SINUS PAIN: 0
VOMITING: 1
SORE THROAT: 0
STRIDOR: 0
TROUBLE SWALLOWING: 0

## 2025-05-16 ENCOUNTER — ROUTINE PRENATAL (OUTPATIENT)
Dept: OBGYN CLINIC | Age: 30
End: 2025-05-16

## 2025-05-16 VITALS — DIASTOLIC BLOOD PRESSURE: 72 MMHG | BODY MASS INDEX: 25.23 KG/M2 | SYSTOLIC BLOOD PRESSURE: 108 MMHG | WEIGHT: 138 LBS

## 2025-05-16 DIAGNOSIS — Z34.80 SUPERVISION OF OTHER NORMAL PREGNANCY, ANTEPARTUM: Primary | ICD-10-CM

## 2025-05-16 DIAGNOSIS — Z3A.33 33 WEEKS GESTATION OF PREGNANCY: ICD-10-CM

## 2025-05-16 NOTE — PROGRESS NOTES
Patient's last menstrual period was 09/21/2024 (approximate).  Estimated Date of Delivery: 6/28/25   Please reference prenatal and OB flow chart for further information  PT here today for routine prenatal care  Pt endorses fetal movement and denies loss of fluid, contractions or vaginal bleeding  Pt without complaints  ROS:  Pt denies headache, dysuria, nausea/vomiting  PT denies chest pain or SOB  PE:  /72   Wt 62.6 kg (138 lb)   LMP 09/21/2024 (Approximate)   BMI 25.23 kg/m²   Gen - Alert and oriented x 3  HEENT- NC/AT, CVS - RRR, Lungs - CTAB  Abd - FH Appropriate fetal growth  LE no edema  Reassuring fetal status at this time     Diagnosis Orders   1. Supervision of other normal pregnancy, antepartum        2. 33 weeks gestation of pregnancy            Upon completion of the visit all questions were answered and the patients follow-up and testing schedule were reviewed.  Pt recommended to continue pnvit and iron if ordered along with other supporting meds  Spent 20-25 min total time with pt

## 2025-05-22 ENCOUNTER — HOSPITAL ENCOUNTER (OUTPATIENT)
Dept: ULTRASOUND IMAGING | Age: 30
Discharge: HOME | End: 2025-05-24
Payer: COMMERCIAL

## 2025-05-22 DIAGNOSIS — Z34.83 PRENATAL CARE, SUBSEQUENT PREGNANCY IN THIRD TRIMESTER: ICD-10-CM

## 2025-05-22 DIAGNOSIS — Z3A.30 30 WEEKS GESTATION OF PREGNANCY: ICD-10-CM

## 2025-05-22 PROCEDURE — 76815 OB US LIMITED FETUS(S): CPT

## 2025-06-02 ENCOUNTER — ROUTINE PRENATAL (OUTPATIENT)
Dept: OBGYN CLINIC | Age: 30
End: 2025-06-02
Payer: COMMERCIAL

## 2025-06-02 VITALS — SYSTOLIC BLOOD PRESSURE: 110 MMHG | BODY MASS INDEX: 25.97 KG/M2 | WEIGHT: 142 LBS | DIASTOLIC BLOOD PRESSURE: 64 MMHG

## 2025-06-02 DIAGNOSIS — Z3A.36 36 WEEKS GESTATION OF PREGNANCY: ICD-10-CM

## 2025-06-02 DIAGNOSIS — Z34.80 SUPERVISION OF OTHER NORMAL PREGNANCY, ANTEPARTUM: Primary | ICD-10-CM

## 2025-06-02 PROCEDURE — 1036F TOBACCO NON-USER: CPT | Performed by: OBSTETRICS & GYNECOLOGY

## 2025-06-02 PROCEDURE — G8427 DOCREV CUR MEDS BY ELIG CLIN: HCPCS | Performed by: OBSTETRICS & GYNECOLOGY

## 2025-06-02 PROCEDURE — G8419 CALC BMI OUT NRM PARAM NOF/U: HCPCS | Performed by: OBSTETRICS & GYNECOLOGY

## 2025-06-02 PROCEDURE — 99213 OFFICE O/P EST LOW 20 MIN: CPT | Performed by: OBSTETRICS & GYNECOLOGY

## 2025-06-02 NOTE — PROGRESS NOTES
Patient's last menstrual period was 09/21/2024 (approximate).  Estimated Date of Delivery: 6/28/25   Please reference prenatal and OB flow chart for further information  PT here today for routine prenatal care  Pt endorses fetal movement and denies loss of fluid, contractions or vaginal bleeding  Pt without complaints  ROS:  Pt denies headache, dysuria, nausea/vomiting  PT denies chest pain or SOB  PE:  /64   Wt 64.4 kg (142 lb)   LMP 09/21/2024 (Approximate)   BMI 25.97 kg/m²   Gen - Alert and oriented x 3  HEENT- NC/AT, CVS - RRR, Lungs - CTAB  Abd - FH Appropriate fetal growth  LE no edema  Reassuring fetal status at this time     Diagnosis Orders   1. Supervision of other normal pregnancy, antepartum  Culture, Strep B Screen, Vaginal/Rectal      2. 36 weeks gestation of pregnancy  Culture, Strep B Screen, Vaginal/Rectal          Upon completion of the visit all questions were answered and the patients follow-up and testing schedule were reviewed.  Pt recommended to continue pnvit and iron if ordered along with other supporting meds  Spent 20-25 min total time with pt

## 2025-06-05 LAB — GP B STREP SPEC QL CULT: NORMAL

## 2025-06-10 ENCOUNTER — ROUTINE PRENATAL (OUTPATIENT)
Dept: OBGYN CLINIC | Age: 30
End: 2025-06-10
Payer: COMMERCIAL

## 2025-06-10 VITALS — DIASTOLIC BLOOD PRESSURE: 72 MMHG | SYSTOLIC BLOOD PRESSURE: 110 MMHG | BODY MASS INDEX: 26.15 KG/M2 | WEIGHT: 143 LBS

## 2025-06-10 DIAGNOSIS — Z34.80 SUPERVISION OF OTHER NORMAL PREGNANCY, ANTEPARTUM: Primary | ICD-10-CM

## 2025-06-10 DIAGNOSIS — Z3A.37 37 WEEKS GESTATION OF PREGNANCY: ICD-10-CM

## 2025-06-10 PROCEDURE — G8427 DOCREV CUR MEDS BY ELIG CLIN: HCPCS | Performed by: OBSTETRICS & GYNECOLOGY

## 2025-06-10 PROCEDURE — 1036F TOBACCO NON-USER: CPT | Performed by: OBSTETRICS & GYNECOLOGY

## 2025-06-10 PROCEDURE — 99213 OFFICE O/P EST LOW 20 MIN: CPT | Performed by: OBSTETRICS & GYNECOLOGY

## 2025-06-10 PROCEDURE — G8419 CALC BMI OUT NRM PARAM NOF/U: HCPCS | Performed by: OBSTETRICS & GYNECOLOGY

## 2025-06-10 NOTE — PROGRESS NOTES
Patient's last menstrual period was 09/21/2024 (approximate).  Estimated Date of Delivery: 6/28/25   Please reference prenatal and OB flow chart for further information  PT here today for routine prenatal care  Pt endorses fetal movement and denies loss of fluid, contractions or vaginal bleeding  Pt without complaints  ROS:  Pt denies headache, dysuria, nausea/vomiting  PT denies chest pain or SOB  PE:  /72   Wt 64.9 kg (143 lb)   LMP 09/21/2024 (Approximate)   BMI 26.15 kg/m²   Gen - Alert and oriented x 3  HEENT- NC/AT, CVS - RRR, Lungs - CTAB  Abd - FH Appropriate fetal growth  LE no edema  Reassuring fetal status at this time     Diagnosis Orders   1. Supervision of other normal pregnancy, antepartum        2. 37 weeks gestation of pregnancy            Upon completion of the visit all questions were answered and the patients follow-up and testing schedule were reviewed.  Pt recommended to continue pnvit and iron if ordered along with other supporting meds  Spent 20-25 min total time with pt

## 2025-06-19 ENCOUNTER — ROUTINE PRENATAL (OUTPATIENT)
Dept: OBGYN CLINIC | Age: 30
End: 2025-06-19
Payer: COMMERCIAL

## 2025-06-19 VITALS — DIASTOLIC BLOOD PRESSURE: 72 MMHG | BODY MASS INDEX: 26.7 KG/M2 | SYSTOLIC BLOOD PRESSURE: 108 MMHG | WEIGHT: 146 LBS

## 2025-06-19 DIAGNOSIS — Z3A.38 38 WEEKS GESTATION OF PREGNANCY: ICD-10-CM

## 2025-06-19 DIAGNOSIS — Z34.80 SUPERVISION OF OTHER NORMAL PREGNANCY, ANTEPARTUM: Primary | ICD-10-CM

## 2025-06-19 PROCEDURE — 1036F TOBACCO NON-USER: CPT | Performed by: OBSTETRICS & GYNECOLOGY

## 2025-06-19 PROCEDURE — 99213 OFFICE O/P EST LOW 20 MIN: CPT | Performed by: OBSTETRICS & GYNECOLOGY

## 2025-06-19 PROCEDURE — G8427 DOCREV CUR MEDS BY ELIG CLIN: HCPCS | Performed by: OBSTETRICS & GYNECOLOGY

## 2025-06-19 PROCEDURE — G8419 CALC BMI OUT NRM PARAM NOF/U: HCPCS | Performed by: OBSTETRICS & GYNECOLOGY

## 2025-06-19 NOTE — PROGRESS NOTES
Patient's last menstrual period was 09/21/2024 (approximate).  Estimated Date of Delivery: 6/28/25   Please reference prenatal and OB flow chart for further information  PT here today for routine prenatal care  Pt endorses fetal movement and denies loss of fluid, contractions or vaginal bleeding  Pt without complaints  ROS:  Pt denies headache, dysuria, nausea/vomiting  PT denies chest pain or SOB  PE:  /72   Wt 66.2 kg (146 lb)   LMP 09/21/2024 (Approximate)   BMI 26.70 kg/m²   Gen - Alert and oriented x 3  HEENT- NC/AT, CVS - RRR, Lungs - CTAB  Abd - FH Appropriate fetal growth  LE no edema  Reassuring fetal status at this time     Diagnosis Orders   1. Supervision of other normal pregnancy, antepartum        2. 38 weeks gestation of pregnancy            Upon completion of the visit all questions were answered and the patients follow-up and testing schedule were reviewed.  Pt recommended to continue pnvit and iron if ordered along with other supporting meds  Spent 20-25 min total time with pt    Induciton for 6/24

## 2025-06-24 ENCOUNTER — HOSPITAL ENCOUNTER (INPATIENT)
Age: 30
LOS: 2 days | Discharge: HOME | DRG: 560 | End: 2025-06-26
Attending: OBSTETRICS & GYNECOLOGY | Admitting: OBSTETRICS & GYNECOLOGY
Payer: COMMERCIAL

## 2025-06-24 ENCOUNTER — ANESTHESIA (OUTPATIENT)
Dept: LABOR AND DELIVERY | Age: 30
DRG: 560 | End: 2025-06-24
Payer: COMMERCIAL

## 2025-06-24 ENCOUNTER — ANESTHESIA EVENT (OUTPATIENT)
Dept: LABOR AND DELIVERY | Age: 30
DRG: 560 | End: 2025-06-24
Payer: COMMERCIAL

## 2025-06-24 ENCOUNTER — APPOINTMENT (OUTPATIENT)
Dept: LABOR AND DELIVERY | Age: 30
DRG: 560 | End: 2025-06-24
Payer: COMMERCIAL

## 2025-06-24 PROBLEM — Z37.9 NORMAL LABOR: Status: ACTIVE | Noted: 2025-06-24

## 2025-06-24 PROBLEM — Z3A.39 39 WEEKS GESTATION OF PREGNANCY: Status: ACTIVE | Noted: 2025-06-24

## 2025-06-24 LAB
ABO/RH: NORMAL
ALBUMIN SERPL-MCNC: 3.3 G/DL (ref 3.5–4.6)
ALP SERPL-CCNC: 130 U/L (ref 40–130)
ALT SERPL-CCNC: 11 U/L (ref 0–33)
AMPHET UR QL SCN: NORMAL
ANION GAP SERPL CALCULATED.3IONS-SCNC: 13 MEQ/L (ref 9–15)
ANTIBODY SCREEN: NORMAL
AST SERPL-CCNC: 16 U/L (ref 0–35)
BARBITURATES UR QL SCN: NORMAL
BASOPHILS # BLD: 0.1 K/UL (ref 0–0.2)
BASOPHILS NFR BLD: 0.6 %
BENZODIAZ UR QL SCN: NORMAL
BILIRUB SERPL-MCNC: 0.3 MG/DL (ref 0.2–0.7)
BILIRUB UR QL STRIP: NEGATIVE
BUN SERPL-MCNC: 7 MG/DL (ref 6–20)
CALCIUM SERPL-MCNC: 8.5 MG/DL (ref 8.5–9.9)
CANNABINOIDS UR QL SCN: NORMAL
CHLORIDE SERPL-SCNC: 100 MEQ/L (ref 95–107)
CLARITY UR: CLEAR
CO2 SERPL-SCNC: 21 MEQ/L (ref 20–31)
COCAINE UR QL SCN: NORMAL
COLOR UR: YELLOW
CREAT SERPL-MCNC: 0.35 MG/DL (ref 0.5–0.9)
DRUG SCREEN COMMENT UR-IMP: NORMAL
EOSINOPHIL # BLD: 0.1 K/UL (ref 0–0.7)
EOSINOPHIL NFR BLD: 0.7 %
ERYTHROCYTE [DISTWIDTH] IN BLOOD BY AUTOMATED COUNT: 13.7 % (ref 11.5–14.5)
FENTANYL SCREEN, URINE: NORMAL
GLOBULIN SER CALC-MCNC: 2.9 G/DL (ref 2.3–3.5)
GLUCOSE SERPL-MCNC: 97 MG/DL (ref 70–99)
GLUCOSE UR STRIP-MCNC: NEGATIVE MG/DL
HBV SURFACE AG SERPL QL IA: NORMAL
HCT VFR BLD AUTO: 31 % (ref 37–47)
HGB BLD-MCNC: 10.5 G/DL (ref 12–16)
HGB UR QL STRIP: NEGATIVE
HIV AG/AB: NONREACTIVE
KETONES UR STRIP-MCNC: NEGATIVE MG/DL
LEUKOCYTE ESTERASE UR QL STRIP: NEGATIVE
LYMPHOCYTES # BLD: 2.4 K/UL (ref 1–4.8)
LYMPHOCYTES NFR BLD: 29.7 %
MCH RBC QN AUTO: 33.1 PG (ref 27–31.3)
MCHC RBC AUTO-ENTMCNC: 33.9 % (ref 33–37)
MCV RBC AUTO: 97.8 FL (ref 79.4–94.8)
METHADONE UR QL SCN: NORMAL
MONOCYTES # BLD: 0.7 K/UL (ref 0.2–0.8)
MONOCYTES NFR BLD: 8.6 %
NEUTROPHILS # BLD: 4.8 K/UL (ref 1.4–6.5)
NEUTS SEG NFR BLD: 58.5 %
NITRITE UR QL STRIP: NEGATIVE
OPIATES UR QL SCN: NORMAL
OXYCODONE UR QL SCN: NORMAL
PCP UR QL SCN: NORMAL
PH UR STRIP: 7.5 [PH] (ref 5–9)
PLATELET # BLD AUTO: 289 K/UL (ref 130–400)
POTASSIUM SERPL-SCNC: 3.5 MEQ/L (ref 3.4–4.9)
PROPOXYPH UR QL SCN: NORMAL
PROT SERPL-MCNC: 6.2 G/DL (ref 6.3–8)
PROT UR STRIP-MCNC: NEGATIVE MG/DL
RBC # BLD AUTO: 3.17 M/UL (ref 4.2–5.4)
REAGIN+T PALLIDUM IGG+IGM SERPL-IMP: NORMAL
SODIUM SERPL-SCNC: 134 MEQ/L (ref 135–144)
SP GR UR STRIP: 1.02 (ref 1–1.03)
UROBILINOGEN UR STRIP-ACNC: 1 E.U./DL
WBC # BLD AUTO: 8.2 K/UL (ref 4.8–10.8)

## 2025-06-24 PROCEDURE — 3E033VJ INTRODUCTION OF OTHER HORMONE INTO PERIPHERAL VEIN, PERCUTANEOUS APPROACH: ICD-10-PCS | Performed by: OBSTETRICS & GYNECOLOGY

## 2025-06-24 PROCEDURE — 80307 DRUG TEST PRSMV CHEM ANLYZR: CPT

## 2025-06-24 PROCEDURE — 85025 COMPLETE CBC W/AUTO DIFF WBC: CPT

## 2025-06-24 PROCEDURE — 10907ZC DRAINAGE OF AMNIOTIC FLUID, THERAPEUTIC FROM PRODUCTS OF CONCEPTION, VIA NATURAL OR ARTIFICIAL OPENING: ICD-10-PCS | Performed by: OBSTETRICS & GYNECOLOGY

## 2025-06-24 PROCEDURE — 87389 HIV-1 AG W/HIV-1&-2 AB AG IA: CPT

## 2025-06-24 PROCEDURE — 87340 HEPATITIS B SURFACE AG IA: CPT

## 2025-06-24 PROCEDURE — 2580000003 HC RX 258: Performed by: OBSTETRICS & GYNECOLOGY

## 2025-06-24 PROCEDURE — 6360000002 HC RX W HCPCS

## 2025-06-24 PROCEDURE — 6370000000 HC RX 637 (ALT 250 FOR IP): Performed by: OBSTETRICS & GYNECOLOGY

## 2025-06-24 PROCEDURE — 86900 BLOOD TYPING SEROLOGIC ABO: CPT

## 2025-06-24 PROCEDURE — 81003 URINALYSIS AUTO W/O SCOPE: CPT

## 2025-06-24 PROCEDURE — 86850 RBC ANTIBODY SCREEN: CPT

## 2025-06-24 PROCEDURE — 86780 TREPONEMA PALLIDUM: CPT

## 2025-06-24 PROCEDURE — 80053 COMPREHEN METABOLIC PANEL: CPT

## 2025-06-24 PROCEDURE — 1220000000 HC SEMI PRIVATE OB R&B

## 2025-06-24 PROCEDURE — 86901 BLOOD TYPING SEROLOGIC RH(D): CPT

## 2025-06-24 PROCEDURE — 59409 OBSTETRICAL CARE: CPT | Performed by: OBSTETRICS & GYNECOLOGY

## 2025-06-24 PROCEDURE — 0KQM0ZZ REPAIR PERINEUM MUSCLE, OPEN APPROACH: ICD-10-PCS | Performed by: OBSTETRICS & GYNECOLOGY

## 2025-06-24 RX ORDER — DOCUSATE SODIUM 100 MG/1
100 CAPSULE, LIQUID FILLED ORAL 2 TIMES DAILY PRN
Status: DISCONTINUED | OUTPATIENT
Start: 2025-06-24 | End: 2025-06-24

## 2025-06-24 RX ORDER — ONDANSETRON 2 MG/ML
4 INJECTION INTRAMUSCULAR; INTRAVENOUS EVERY 6 HOURS PRN
Status: DISCONTINUED | OUTPATIENT
Start: 2025-06-24 | End: 2025-06-24

## 2025-06-24 RX ORDER — ONDANSETRON 4 MG/1
4 TABLET, ORALLY DISINTEGRATING ORAL EVERY 6 HOURS PRN
Status: DISCONTINUED | OUTPATIENT
Start: 2025-06-24 | End: 2025-06-26 | Stop reason: HOSPADM

## 2025-06-24 RX ORDER — LIDOCAINE HYDROCHLORIDE 20 MG/ML
INJECTION, SOLUTION INFILTRATION; PERINEURAL
Status: DISCONTINUED
Start: 2025-06-24 | End: 2025-06-24

## 2025-06-24 RX ORDER — DOCUSATE SODIUM 100 MG/1
100 CAPSULE, LIQUID FILLED ORAL DAILY
Status: DISCONTINUED | OUTPATIENT
Start: 2025-06-25 | End: 2025-06-26 | Stop reason: HOSPADM

## 2025-06-24 RX ORDER — SODIUM CHLORIDE, SODIUM LACTATE, POTASSIUM CHLORIDE, AND CALCIUM CHLORIDE .6; .31; .03; .02 G/100ML; G/100ML; G/100ML; G/100ML
1000 INJECTION, SOLUTION INTRAVENOUS PRN
Status: DISCONTINUED | OUTPATIENT
Start: 2025-06-24 | End: 2025-06-24

## 2025-06-24 RX ORDER — MODIFIED LANOLIN
OINTMENT (GRAM) TOPICAL PRN
Status: DISCONTINUED | OUTPATIENT
Start: 2025-06-24 | End: 2025-06-26 | Stop reason: HOSPADM

## 2025-06-24 RX ORDER — NALOXONE HYDROCHLORIDE 0.4 MG/ML
INJECTION, SOLUTION INTRAMUSCULAR; INTRAVENOUS; SUBCUTANEOUS PRN
Status: DISCONTINUED | OUTPATIENT
Start: 2025-06-24 | End: 2025-06-24

## 2025-06-24 RX ORDER — SODIUM CHLORIDE, SODIUM LACTATE, POTASSIUM CHLORIDE, CALCIUM CHLORIDE 600; 310; 30; 20 MG/100ML; MG/100ML; MG/100ML; MG/100ML
INJECTION, SOLUTION INTRAVENOUS CONTINUOUS
Status: DISCONTINUED | OUTPATIENT
Start: 2025-06-24 | End: 2025-06-24

## 2025-06-24 RX ORDER — SODIUM CHLORIDE, SODIUM LACTATE, POTASSIUM CHLORIDE, AND CALCIUM CHLORIDE .6; .31; .03; .02 G/100ML; G/100ML; G/100ML; G/100ML
500 INJECTION, SOLUTION INTRAVENOUS PRN
Status: DISCONTINUED | OUTPATIENT
Start: 2025-06-24 | End: 2025-06-24

## 2025-06-24 RX ORDER — BISACODYL 10 MG
10 SUPPOSITORY, RECTAL RECTAL DAILY PRN
Status: DISCONTINUED | OUTPATIENT
Start: 2025-06-24 | End: 2025-06-26 | Stop reason: HOSPADM

## 2025-06-24 RX ORDER — NALBUPHINE HYDROCHLORIDE 10 MG/ML
10 INJECTION INTRAMUSCULAR; INTRAVENOUS; SUBCUTANEOUS
Status: DISCONTINUED | OUTPATIENT
Start: 2025-06-24 | End: 2025-06-24

## 2025-06-24 RX ORDER — METHYLERGONOVINE MALEATE 0.2 MG/ML
200 INJECTION INTRAVENOUS PRN
Status: DISCONTINUED | OUTPATIENT
Start: 2025-06-24 | End: 2025-06-24

## 2025-06-24 RX ORDER — ONDANSETRON 2 MG/ML
4 INJECTION INTRAMUSCULAR; INTRAVENOUS EVERY 6 HOURS PRN
Status: DISCONTINUED | OUTPATIENT
Start: 2025-06-24 | End: 2025-06-26 | Stop reason: HOSPADM

## 2025-06-24 RX ORDER — SODIUM CHLORIDE 9 MG/ML
INJECTION, SOLUTION INTRAVENOUS PRN
Status: DISCONTINUED | OUTPATIENT
Start: 2025-06-24 | End: 2025-06-26 | Stop reason: HOSPADM

## 2025-06-24 RX ORDER — IBUPROFEN 800 MG/1
800 TABLET, FILM COATED ORAL EVERY 8 HOURS SCHEDULED
Status: DISCONTINUED | OUTPATIENT
Start: 2025-06-24 | End: 2025-06-26 | Stop reason: HOSPADM

## 2025-06-24 RX ORDER — SODIUM CHLORIDE 0.9 % (FLUSH) 0.9 %
5-40 SYRINGE (ML) INJECTION PRN
Status: DISCONTINUED | OUTPATIENT
Start: 2025-06-24 | End: 2025-06-26 | Stop reason: HOSPADM

## 2025-06-24 RX ORDER — CARBOPROST TROMETHAMINE 250 UG/ML
250 INJECTION, SOLUTION INTRAMUSCULAR PRN
Status: DISCONTINUED | OUTPATIENT
Start: 2025-06-24 | End: 2025-06-24

## 2025-06-24 RX ORDER — SODIUM CHLORIDE 0.9 % (FLUSH) 0.9 %
5-40 SYRINGE (ML) INJECTION EVERY 12 HOURS SCHEDULED
Status: DISCONTINUED | OUTPATIENT
Start: 2025-06-24 | End: 2025-06-24

## 2025-06-24 RX ORDER — SODIUM CHLORIDE 0.9 % (FLUSH) 0.9 %
5-40 SYRINGE (ML) INJECTION PRN
Status: DISCONTINUED | OUTPATIENT
Start: 2025-06-24 | End: 2025-06-24

## 2025-06-24 RX ORDER — SODIUM CHLORIDE 0.9 % (FLUSH) 0.9 %
5-40 SYRINGE (ML) INJECTION EVERY 12 HOURS SCHEDULED
Status: DISCONTINUED | OUTPATIENT
Start: 2025-06-24 | End: 2025-06-26 | Stop reason: HOSPADM

## 2025-06-24 RX ORDER — LEVOCARNITINE 1 G/10ML
330 SOLUTION ORAL 2 TIMES DAILY
Status: DISCONTINUED | OUTPATIENT
Start: 2025-06-24 | End: 2025-06-26 | Stop reason: HOSPADM

## 2025-06-24 RX ORDER — ACETAMINOPHEN 500 MG
1000 TABLET ORAL EVERY 8 HOURS SCHEDULED
Status: DISCONTINUED | OUTPATIENT
Start: 2025-06-24 | End: 2025-06-26 | Stop reason: HOSPADM

## 2025-06-24 RX ORDER — SODIUM CHLORIDE 9 MG/ML
INJECTION, SOLUTION INTRAVENOUS PRN
Status: DISCONTINUED | OUTPATIENT
Start: 2025-06-24 | End: 2025-06-24

## 2025-06-24 RX ORDER — ACETAMINOPHEN 325 MG/1
650 TABLET ORAL EVERY 4 HOURS PRN
Status: DISCONTINUED | OUTPATIENT
Start: 2025-06-24 | End: 2025-06-24

## 2025-06-24 RX ADMIN — Medication 25 MCG: at 09:25

## 2025-06-24 RX ADMIN — SODIUM CHLORIDE, SODIUM LACTATE, POTASSIUM CHLORIDE, AND CALCIUM CHLORIDE: .6; .31; .03; .02 INJECTION, SOLUTION INTRAVENOUS at 15:05

## 2025-06-24 RX ADMIN — BENZOCAINE AND LEVOMENTHOL: 200; 5 SPRAY TOPICAL at 23:42

## 2025-06-24 RX ADMIN — LIDOCAINE HYDROCHLORIDE: 20 INJECTION, SOLUTION INFILTRATION; PERINEURAL at 22:33

## 2025-06-24 RX ADMIN — IBUPROFEN 800 MG: 800 TABLET ORAL at 23:44

## 2025-06-24 RX ADMIN — Medication 1 MILLI-UNITS/MIN: at 15:06

## 2025-06-24 RX ADMIN — Medication 330 MG: at 23:43

## 2025-06-24 ASSESSMENT — PAIN DESCRIPTION - LOCATION: LOCATION: ABDOMEN;VAGINA

## 2025-06-24 ASSESSMENT — PAIN SCALES - GENERAL: PAINLEVEL_OUTOF10: 5

## 2025-06-24 ASSESSMENT — PAIN DESCRIPTION - DESCRIPTORS: DESCRIPTORS: CRAMPING;SORE

## 2025-06-24 NOTE — PROGRESS NOTES
Arrives for scheduled induction. Into 317. Oriented to room, call light, etc, verbalized understanding. Instructed on UA sample, verbalized understanding. Into BR to void and change into gown

## 2025-06-24 NOTE — PROGRESS NOTES
18 G started in L forearm on 1st attempt. Labs drawn and sent to lab. Tolerated well. HL flushed and patent. Tolerated well

## 2025-06-24 NOTE — ANESTHESIA PRE PROCEDURE
Department of Anesthesiology  Preprocedure Note       Name:  CARLA SANTOS   Age:  30 y.o.  :  1995                                          MRN:  [id number]         Date:  2025      Surgeon: * No surgeons listed *    Procedure: * No procedures listed *    Medications prior to admission:   Prior to Admission medications    Medication Sig Start Date End Date Taking? Authorizing Provider   ferrous sulfate (IRON 325) 325 (65 Fe) MG tablet Take 1 tablet by mouth 2 times daily 3/31/25  Yes Parish Woodward DO   levOCARNitine (CARNITOR) 330 MG tablet TAKE 1 TABLET BY MOUTH TWICE A DAY 3/17/21  Yes Provider, MD Isabel   Prenatal Vit-DSS-Fe Cbn-FA (PRENATAL AD PO) Take by mouth   Yes ProviderIsabel MD   ondansetron (ZOFRAN-ODT) 4 MG disintegrating tablet Take 1 tablet by mouth every 6-8 hours as needed for Nausea or Vomiting  Patient not taking: Reported on 2025 3/12/25   Charity Dhillon, APRN - NP       Current medications:    Current Facility-Administered Medications   Medication Dose Route Frequency Provider Last Rate Last Admin    lactated ringers infusion   IntraVENous Continuous Parish oWodward,         lactated ringers bolus 500 mL  500 mL IntraVENous PRN Parish Woodward DO        Or    lactated ringers bolus 1,000 mL  1,000 mL IntraVENous PRN Parish Woodward,         sodium chloride flush 0.9 % injection 5-40 mL  5-40 mL IntraVENous 2 times per day Parish Woodward,         sodium chloride flush 0.9 % injection 5-40 mL  5-40 mL IntraVENous PRN Parish Woodward, DO        0.9 % sodium chloride infusion   IntraVENous PRN Parish Woodward,         methylergonovine (METHERGINE) injection 200 mcg  200 mcg IntraMUSCular PRN Parish Woodward,         carboprost (HEMABATE) injection 250 mcg  250 mcg IntraMUSCular PRN Parish Woodward,         tranexamic acid (CYKLOKAPRON) 1,000 mg in sodium chloride 0.9 % 110 mL IVPB (addEASE)  1,000 mg IntraVENous

## 2025-06-24 NOTE — PROGRESS NOTES
POC discussed with pt, verbalized understanding. States  left to get some food. Would like to eat and have  here prior to starting Pitocin.

## 2025-06-24 NOTE — PLAN OF CARE
Problem: Vaginal Birth or  Section  Goal: Fetal and maternal status remain reassuring during the birth process  Description:  Birth OB-Pregnancy care plan goal which identifies if the fetal and maternal status remain reassuring during the birth process  2025 by Miguelina Coleman RN  Outcome: Progressing  2025 by Lilibeth Orellana RN  Outcome: Progressing     Problem: Pain  Goal: Verbalizes/displays adequate comfort level or baseline comfort level  2025 by Miguelina Coleman RN  Outcome: Progressing  2025 by Lilibeth Orellana RN  Outcome: Progressing     Problem: Safety - Adult  Goal: Free from fall injury  2025 by Miguelina Coleman RN  Outcome: Progressing  2025 by Lilibeth Orellana RN  Outcome: Progressing

## 2025-06-24 NOTE — PROGRESS NOTES
Dr Trace Oviedo called and informed pt arrived for induction and SVE. Orders received. POC discussed with pt, verbalized understanding

## 2025-06-24 NOTE — PROGRESS NOTES
Department of Obstetrics and Gynecology  Labor and Delivery Progress Note      SUBJECTIVE:  Patient has received vaginal cytotec x 1 for elective induction of labor. She is experiencing occasional contractions. She offers no additional concerns.   OBJECTIVE:      Vitals:    BP (!) 122/59   Pulse 88   Temp 98.1 °F (36.7 °C) (Oral)   Resp 16   Ht 1.575 m (5' 2\")   Wt 71.2 kg (157 lb)   SpO2 100%   BMI 28.72 kg/m²         Fetal heart rate: Category I (REACTIVE)        Baseline Heart Rate:  130s R       Accelerations:  present       Long Term Variability:  moderate       Decelerations:  absent         Contraction frequency: 8 minutes    Fetal Presentation:  Vertex   Membranes:  Intact    Cervix:           Dilation:  3 cm         Effacement:  70%         Station:  -2         Consistency:  medium         Position:             Bishops Score:  5    DATA:  Results     Component Value Units   T Pallidum Screen W/Reflex [[id number]]    Collected: 06/24/25 0908    Updated: 06/24/25 1019    Order Status: Completed    Specimen Source: Blood     Total Syphillis IgG/IgM Non-reactive   Hepatitis B Surface Antigen [[id number]]    Collected: 06/24/25 0908    Updated: 06/24/25 1019    Order Status: Completed     Hep B S Ag Interp Non-reactive   TYPE AND SCREEN [[id number]]    Collected: 06/24/25 0840    Updated: 06/24/25 1002    Order Status: Completed    Specimen Source: Blood     ABO/Rh O POS    Antibody Screen NEG   Comprehensive metabolic panel [[id number]] (Abnormal)    Collected: 06/24/25 0908    Updated: 06/24/25 0932    Order Status: Completed    Specimen Source: Blood     Sodium 134 Low  mEq/L    Potassium 3.5 mEq/L    Chloride 100 mEq/L    CO2 21 mEq/L    Anion Gap 13 mEq/L    Glucose 97 mg/dL    BUN 7 mg/dL    Creatinine 0.35 Low  mg/dL    Est, Glom Filt Rate >90.0    Comment: Pediatric calculator link  [email or web address].org/[email or web address]  Effective Oct 3, 2022  These results are not intended

## 2025-06-25 LAB
ERYTHROCYTE [DISTWIDTH] IN BLOOD BY AUTOMATED COUNT: 13.5 % (ref 11.5–14.5)
HCT VFR BLD AUTO: 29.8 % (ref 37–47)
HGB BLD-MCNC: 10.2 G/DL (ref 12–16)
MCH RBC QN AUTO: 33 PG (ref 27–31.3)
MCHC RBC AUTO-ENTMCNC: 34.2 % (ref 33–37)
MCV RBC AUTO: 96.4 FL (ref 79.4–94.8)
PLATELET # BLD AUTO: 278 K/UL (ref 130–400)
RBC # BLD AUTO: 3.09 M/UL (ref 4.2–5.4)
WBC # BLD AUTO: 13.1 K/UL (ref 4.8–10.8)

## 2025-06-25 PROCEDURE — 85027 COMPLETE CBC AUTOMATED: CPT

## 2025-06-25 PROCEDURE — 1220000000 HC SEMI PRIVATE OB R&B

## 2025-06-25 PROCEDURE — 6370000000 HC RX 637 (ALT 250 FOR IP): Performed by: OBSTETRICS & GYNECOLOGY

## 2025-06-25 RX ORDER — ONDANSETRON 2 MG/ML
4 INJECTION INTRAMUSCULAR; INTRAVENOUS EVERY 6 HOURS PRN
Status: DISCONTINUED | OUTPATIENT
Start: 2025-06-25 | End: 2025-06-26 | Stop reason: HOSPADM

## 2025-06-25 RX ORDER — ONDANSETRON 4 MG/1
4 TABLET, ORALLY DISINTEGRATING ORAL EVERY 6 HOURS PRN
Status: DISCONTINUED | OUTPATIENT
Start: 2025-06-25 | End: 2025-06-26 | Stop reason: HOSPADM

## 2025-06-25 RX ORDER — MISOPROSTOL 200 UG/1
400 TABLET ORAL PRN
Status: DISCONTINUED | OUTPATIENT
Start: 2025-06-25 | End: 2025-06-26 | Stop reason: HOSPADM

## 2025-06-25 RX ADMIN — Medication 330 MG: at 21:48

## 2025-06-25 RX ADMIN — Medication 330 MG: at 13:42

## 2025-06-25 RX ADMIN — ACETAMINOPHEN 1000 MG: 500 TABLET ORAL at 21:48

## 2025-06-25 RX ADMIN — ACETAMINOPHEN 1000 MG: 500 TABLET ORAL at 04:44

## 2025-06-25 RX ADMIN — DOCUSATE SODIUM 100 MG: 100 CAPSULE, LIQUID FILLED ORAL at 08:44

## 2025-06-25 RX ADMIN — Medication: at 08:43

## 2025-06-25 RX ADMIN — ACETAMINOPHEN 1000 MG: 500 TABLET ORAL at 13:43

## 2025-06-25 RX ADMIN — IBUPROFEN 800 MG: 800 TABLET ORAL at 08:44

## 2025-06-25 RX ADMIN — IBUPROFEN 800 MG: 800 TABLET ORAL at 16:16

## 2025-06-25 ASSESSMENT — PAIN DESCRIPTION - LOCATION
LOCATION: PERINEUM
LOCATION: ABDOMEN
LOCATION: ABDOMEN;VAGINA
LOCATION: ABDOMEN

## 2025-06-25 ASSESSMENT — PAIN SCALES - GENERAL
PAINLEVEL_OUTOF10: 1
PAINLEVEL_OUTOF10: 1
PAINLEVEL_OUTOF10: 3
PAINLEVEL_OUTOF10: 1
PAINLEVEL_OUTOF10: 2

## 2025-06-25 ASSESSMENT — PAIN DESCRIPTION - DESCRIPTORS: DESCRIPTORS: CRAMPING;SORE

## 2025-06-25 NOTE — LACTATION NOTE
This note was copied from a baby's chart.  -initial lactation visit  -mom denies latch difficulties or pain with feeds  -baby has + output  -mother reports previous breastfeeding experience x 2 older children for 1 yr each  -provided with written breastfeeding education materials and reviewed  -offered support and encouragement   -recommend frequent skin to skin, breastfeed per hunger cues  -contact LC as needed for assistance  -parents both verbalize understanding of teaching      1255-follow up  -mom resting in bed with eyes closed  -baby swaddled and being held by dad  -FOB states baby just fed recently  -denies questions/concerns re: infant feeding at this time  -will continue to follow    1410-follow up at request of RN  -baby showing active feeding cues during attempt to perform hearing screen  -returned infant to mother and offered assistance with feed   -mother accepted  -encouraged mother to lean back and bring baby to her  -focus on deep latch for optimal milk transfer  -positioned belly to belly, aim nipple to nose and wait for wide gape, making sure chin is elevated  -baby latched deeply with minimal assistance  -rhythmic sucking and deep latch noted  -mom denies pain and swallows audible  -per chart reviewed, 2 wet, 1 stool diaper documented  -encouraged use of stimulation techniques throughout feed  -offered support  -call for feeds as needed  -will continue to follow

## 2025-06-25 NOTE — PLAN OF CARE
Problem: Vaginal Birth or  Section  Goal: Fetal and maternal status remain reassuring during the birth process  Description:  Birth OB-Pregnancy care plan goal which identifies if the fetal and maternal status remain reassuring during the birth process  2025 by Eileen Diaz RN  Outcome: Progressing  2025 by Tayler Carlos RN  Outcome: Progressing     Problem: Pain  Goal: Verbalizes/displays adequate comfort level or baseline comfort level  2025 by Eileen Diaz RN  Outcome: Progressing  2025 by Tayler Carlos RN  Outcome: Progressing     Problem: Safety - Adult  Goal: Free from fall injury  2025 by Eileen Diaz RN  Outcome: Progressing  2025 by Tayler Carlos RN  Outcome: Progressing

## 2025-06-25 NOTE — PROCEDURES
PROCEDURE NOTE: VAGINAL DELIVERY  30 y.o.  at 39w3d GA who presented to OB triage for induction of labor. Pt underwent induction of labor with cytotec followed by pitocin and AROM noting clear fluid. Pt with epidural for pain management. Pt then with rectal pressure and the desire to push. PT was instructed on pushing efforts and the infant's head was delivered atraumatically followed quickly by the rest of the body. Nuchal cord x 1 was noted which was doubly clamped and cut to  reduce upon delivery. The infant with spontaneous cry was then laid on the mother's abdomen. The cord blood was then drawn for labs and the placenta delivered spontaneously. Three vessel cord noted.  The vaginal and cervix were inspected and a second degree lacerations was noted and repaired in the midline with 3-0 and 3-0 vicryl in layered fashion.   The infant, a viable male infant was born at 2223 with Apgars 8 and 9 and wt pending  Mother was noted to have excellent uterine tone. Sponge and instrument counts were correct x 2 and mother and baby were recovered in room without difficulty.     EBL: 200cc    Parish Hall DO

## 2025-06-25 NOTE — PROGRESS NOTES
Department of Obstetrics and Gynecology  Labor and Delivery   Vaginal Delivery Post-Partum Progress Note      Subjective:     CARLA SANTOS is a 30 y.o. female  at 39w3d    Postpartum Day 1: Vaginal Delivery on 2025 with a baby boy.    The patient feels well. She denies emotional concerns, her pain is well controlled with current medications.  She is ambulating well and is tolerating a normal diet.     The baby is well and feeding well.    Objective:      Patient Vitals for the past 16 hrs:   BP Temp Temp src Pulse Resp SpO2   25 0749 106/66 97.7 °F (36.5 °C) Oral 68 18 99 %   25 0434 (!) 84/45 -- -- 70 18 95 %   25 0019 103/66 98 °F (36.7 °C) Oral 60 18 97 %   25 0004 102/68 -- -- 69 18 --   25 2349 104/71 -- -- 69 18 --   25 2334 102/66 -- -- 71 18 --   25 2319 97/62 -- -- 85 18 --   25 2304 (!) 93/59 -- -- 76 18 --   25 2249 94/60 98 °F (36.7 °C) Oral 86 18 --   25 2234 100/69 -- -- 80 18 --   25 2145 -- 97.9 °F (36.6 °C) Oral -- -- --   25 2144 110/66 -- -- 83 18 --   25 2040 (!) 98/56 -- -- 74 16 --   25 -- 98 °F (36.7 °C) Oral -- -- --   25 1939 98/63 -- -- 72 18 --   25 1839 106/70 -- -- 84 18 --       General:    alert, cooperative, and no distress   Bowel Sounds:  active   Lochia:  appropriate   Uterine Fundus:   firm, with appropriate involution   Perineum:  Episiotomy:  None   Laceration:  Second Degree, repaired   DVT Evaluation:  No evidence of DVT seen on physical exam.     Labs:     Blood Type/Rh:  O POS       Group B Strep Culture   Date Value Ref Range Status   2025   Final    Rule Out Grp.B Strep:  NEGATIVE FOR GROUP B STREPTOCOCCI  Performed at [facility] [employer] [facility] [address] [place] [id number] ([phone]       Hemoglobin   Date Value Ref Range Status   2025 10.2 (L) 12.0 - 16.0 g/dL Final   2025 10.5 (L) 12.0 - 16.0 g/dL Final     Hematocrit   Date

## 2025-06-25 NOTE — FLOWSHEET NOTE
Fetal heart rate and uterine activity evaluated every 5 minutes while pt is pushing. RN remains continuously at bedside.

## 2025-06-25 NOTE — FLOWSHEET NOTE
Pt up x1 assist to the bathroom. Denies feeling dizzy or lightheaded. Voided 400 ml. No clots noted. Pia care and dermaplast provided.

## 2025-06-25 NOTE — H&P
Department of Obstetrics and Gynecology  Attending Obstetrics History and Physical        CHIEF COMPLAINT:  elective induction    HISTORY OF PRESENT ILLNESS:      The patient is  @ 39.3 weeks presents for induction of labor. No bleeding no contractions no loss pf fluid. Pts pregnancy complicated by abnormal 1hr no three hour obtained.   Bhavin has had a pregnancy that has been complicated by  Patient presents with a chief complaint as above and is being admitted for active phase labor      Past Medical History:        Diagnosis Date    Carnitine deficiency      Past Surgical History:    History reviewed. No pertinent surgical history.  Social History:    TOBACCO:   reports that she has never smoked. She has never used smokeless tobacco.  ETOH:   reports that she does not currently use alcohol after a past usage of about 1.0 standard drink of alcohol per week.  DRUGS:   reports no history of drug use.  SEXUAL HISTORY:    DOMESTIC VIOLENCE:  no  ACTIVITIES OF DAILY LIVING:    INSTRUMENTAL ACTIVITIES OF DAILY LIVING:  Patient is able to perform all instrumental activities of daily living.  MARITAL STATUS:    OCCUPATION:    LEVEL OF EDUCATION:    Patient currently lives with family   Environmental/chemical exposure:  None  Travel History:  None   Pets:  None    Family History:       Problem Relation Age of Onset    Asthma Mother     Hypertension Father     Diabetes Maternal Grandmother     Hypertension Maternal Grandmother     Diabetes Paternal Grandmother      Medications Prior to Admission:  Medications Prior to Admission: ferrous sulfate (IRON 325) 325 (65 Fe) MG tablet, Take 1 tablet by mouth 2 times daily  levOCARNitine (CARNITOR) 330 MG tablet, TAKE 1 TABLET BY MOUTH TWICE A DAY  Prenatal Vit-DSS-Fe Cbn-FA (PRENATAL AD PO), Take by mouth  ondansetron (ZOFRAN-ODT) 4 MG disintegrating tablet, Take 1 tablet by mouth every 6-8 hours as needed for Nausea or Vomiting (Patient not taking: Reported on

## 2025-06-26 VITALS
HEART RATE: 80 BPM | SYSTOLIC BLOOD PRESSURE: 104 MMHG | OXYGEN SATURATION: 98 % | HEIGHT: 62 IN | TEMPERATURE: 98.3 F | BODY MASS INDEX: 26.87 KG/M2 | RESPIRATION RATE: 18 BRPM | WEIGHT: 146 LBS | DIASTOLIC BLOOD PRESSURE: 56 MMHG

## 2025-06-26 PROCEDURE — 6370000000 HC RX 637 (ALT 250 FOR IP): Performed by: OBSTETRICS & GYNECOLOGY

## 2025-06-26 PROCEDURE — 7200000001 HC VAGINAL DELIVERY

## 2025-06-26 RX ORDER — IBUPROFEN 800 MG/1
800 TABLET, FILM COATED ORAL EVERY 8 HOURS SCHEDULED
Qty: 90 TABLET | Refills: 0 | Status: SHIPPED | OUTPATIENT
Start: 2025-06-26

## 2025-06-26 RX ADMIN — ACETAMINOPHEN 1000 MG: 500 TABLET ORAL at 12:51

## 2025-06-26 RX ADMIN — Medication 330 MG: at 08:59

## 2025-06-26 RX ADMIN — DOCUSATE SODIUM 100 MG: 100 CAPSULE, LIQUID FILLED ORAL at 08:53

## 2025-06-26 RX ADMIN — IBUPROFEN 800 MG: 800 TABLET ORAL at 01:19

## 2025-06-26 RX ADMIN — IBUPROFEN 800 MG: 800 TABLET ORAL at 08:52

## 2025-06-26 RX ADMIN — ACETAMINOPHEN 1000 MG: 500 TABLET ORAL at 05:30

## 2025-06-26 ASSESSMENT — PAIN DESCRIPTION - LOCATION
LOCATION: ABDOMEN
LOCATION: ABDOMEN

## 2025-06-26 ASSESSMENT — PAIN SCALES - GENERAL
PAINLEVEL_OUTOF10: 1

## 2025-06-26 NOTE — PLAN OF CARE
Problem: Vaginal Birth or  Section  Goal: Fetal and maternal status remain reassuring during the birth process  Description:  Birth OB-Pregnancy care plan goal which identifies if the fetal and maternal status remain reassuring during the birth process  2025 by Tayler Carlos RN  Outcome: Completed  2025 by Eileen Diaz RN  Outcome: Progressing     Problem: Pain  Goal: Verbalizes/displays adequate comfort level or baseline comfort level  2025 by Tayler Carlos RN  Outcome: Completed  2025 by Eileen Diaz RN  Outcome: Progressing     Problem: Safety - Adult  Goal: Free from fall injury  2025 by Tayler Carlos RN  Outcome: Completed  2025 by Eileen Diaz, RN  Outcome: Progressing

## 2025-06-26 NOTE — PROGRESS NOTES
Follow-up with your OB doctor in 2 weeks or as specified by your physician.     Please refer to A NEW BEGINNING- YOUR PERSONAL GUIDE TO POSTPARTUM CARE book provided in your room. Please take this book home with you to refer to.    For Breastfeeding moms, you can contact our lactation specialist,  with any problems or questions you may have.  Phone number [phone]. Feel free to leave voice mail and your call will be returned as soon as possible.     DIET  Eat a well balanced diet focusing on foods high in fiber and protein.   Drink 8-10 glasses of fluids daily, especially water.  To avoid constipation you may take a mild stool softener as recommended by your doctor or midwife.    ACTIVITY  Gradually increase your activity.  Resume exercise regimen only after advise by your doctor or midwife.  Avoid lifting anything heavier than your baby or a gallon of milk for SIX weeks.   Avoid driving 1 week for vaginal delivery and 2 weeks for  section, unless otherwise instructed by physician.  Rise slowly from a lying to sitting and then a standing position.  Climb stairs carefully.  Use caution when carrying your baby up and down the stairs.  NO SEXUAL Activity for 6 weeks or until advised by your doctor; Nothing in vagina: intercourse, tampons, or douching.  Be prepared to discuss family planning at your follow-up OB visit.   You may feel tired or have a lack of energy.  You may continue your prenatal vitamin to replenish nutrients post delivery.  Nap when baby naps to catch up on sleep.   May shower, NO tub baths, swimming, or hot tubs.    EMOTIONS  You may feel harris, sad, teary, & overwhelmed for the first 2 weeks postpartum.  Contact your OB provider if you feel you may be showing signs of postpartum depression, or have thoughts of harming yourself or your infant.  If infant will not stop crying, contact another adult for help or place infant in their crib on their back and take a break.  NEVER shake your

## 2025-06-26 NOTE — PLAN OF CARE
Problem: Vaginal Birth or  Section  Goal: Fetal and maternal status remain reassuring during the birth process  Description:  Birth OB-Pregnancy care plan goal which identifies if the fetal and maternal status remain reassuring during the birth process  2025 by Tayler Carlos RN  Outcome: Completed  2025 by Eileen Diaz RN  Outcome: Progressing     Problem: Pain  Goal: Verbalizes/displays adequate comfort level or baseline comfort level  2025 by Tayler Carlos RN  Outcome: Completed  2025 by Eileen Diaz RN  Outcome: Progressing     Problem: Safety - Adult  Goal: Free from fall injury  2025 by Tayler Carlos RN  Outcome: Completed  2025 by Eileen Diaz RN  Outcome: Progressing     Problem: Discharge Planning  Goal: Discharge to home or other facility with appropriate resources  Outcome: Progressing

## 2025-06-26 NOTE — CONSULTS
Spiritual Health History and Assessment/Progress Note  [facility]    Spiritual/Emotional Needs, Rituals, Rites and Sacraments,  ,  ,      Name: CARLA SANTOS MRN: [id number]    Age: 30 y.o.     Sex: female   Language: English   Moravian: Faith   Normal labor     Date: 2025            Total Time Calculated: 25 min              Spiritual Assessment began in [facility]        Referral/Consult From: Patient   Encounter Overview/Reason: Spiritual/Emotional Needs, Rituals, Rites and Sacraments  Service Provided For: Patient and family together     visited patient and family for a spiritual care consult.Patient asked for  to bless her  baby. Patient's other two children had been blessed by a  when they were born and patient wished the same for this baby.  provided a blessing and offered continued support.      Nathaly, Belief, Meaning:   Patient is connected with a nathaly tradition or spiritual practice  Family/Friends are connected with a nathaly tradition or spiritual practice      Importance and Influence:  Patient has spiritual/personal beliefs that influence decisions regarding their health  Family/Friends have spiritual/personal beliefs that influence decisions regarding the patient's health    Community:  Patient feels well-supported. Support system includes: Spouse/Partner, Children, and Extended family  Family/Friends feel well-supported. Support system includes: Spouse/Partner, Children, and Extended family    Assessment and Plan of Care:     Patient Interventions include: Provided sacramental/Sabianist ritual  Family/Friends Interventions include: Provided sacramental/Sabianist ritual    Patient Plan of Care: Spiritual Care available upon further referral  Family/Friends Plan of Care: Spiritual Care available upon further referral    Electronically signed by Chaplain Catherine on 2025 at 9:47 AM

## 2025-06-26 NOTE — PROGRESS NOTES
Patient discharged via wheelchair accompanied by nurse, baby in car seat on moms lap.  All personal belongings with patient.

## 2025-06-26 NOTE — DISCHARGE SUMMARY
PROGRESS NOTE POSTPARTUM DAY 2    Pt doing well. Pt is bottle feeding without issue. Pt eager for discharge.     BP (!) 98/56   Pulse 70   Temp 98.1 °F (36.7 °C) (Oral)   Resp 18   Ht 1.575 m (5' 2\")   Wt 66.2 kg (146 lb)   LMP 2024 (Approximate)   SpO2 (!) 79%   Breastfeeding Unknown   BMI 26.70 kg/m²   Hemoglobin   Date Value Ref Range Status   2025 10.2 (L) 12.0 - 16.0 g/dL Final     CVS: RRR  Lungs: CTAB  ABD: Uterus firm at umbilicus  EXT: no c/c/e    30 y.o.  now P s/p vaginal delivery doing well PPD#2  1. Discharge pt to home  2. Rx Ibuprofen  3. Follow up with Dr. Trace [place] 2wks  4. Nothing in the vagina no tub bathing and no intercourse  Parish Hall, DO

## 2025-06-26 NOTE — LACTATION NOTE
This note was copied from a baby's chart.  -mom reports breastfeeding is going well  -denies latch difficulties or pain with feeds  -baby has + output and weight is WNL  -planned discharge today  -offered support and encouraged to f/u at breastfeeding support group  -schedule peds appt and call warmline with questions/concerns re: infant feeding  -mom verbalized understanding of teaching

## 2025-06-26 NOTE — PLAN OF CARE
Problem: Vaginal Birth or  Section  Goal: Fetal and maternal status remain reassuring during the birth process  Description:  Birth OB-Pregnancy care plan goal which identifies if the fetal and maternal status remain reassuring during the birth process  2025 by Tayler Carlos RN  Outcome: Completed  2025 by Eileen Diaz RN  Outcome: Progressing     Problem: Pain  Goal: Verbalizes/displays adequate comfort level or baseline comfort level  2025 by Tayler Carlos RN  Outcome: Completed  2025 by Eileen Diaz RN  Outcome: Progressing     Problem: Safety - Adult  Goal: Free from fall injury  2025 by Tayler Carlos RN  Outcome: Completed  2025 by Eileen Diaz RN  Outcome: Progressing     Problem: Discharge Planning  Goal: Discharge to home or other facility with appropriate resources  2025 by Tayler Carlos RN  Outcome: Completed  2025 by Tayler Carlos RN  Outcome: Progressing

## 2025-06-28 LAB
ACYLCARNITINE SERPL-SCNC: 4 UMOL/L (ref 5–29)
CARN ESTERS/C0 SERPL-SRTO: 0.2 {RATIO} (ref 0.1–1)
CARNITINE FREE SERPL-SCNC: 16 UMOL/L (ref 25–60)
CARNITINE SERPL-SCNC: 20 UMOL/L (ref 34–86)

## 2025-06-30 ENCOUNTER — RESULTS FOLLOW-UP (OUTPATIENT)
Dept: OBGYN CLINIC | Age: 30
End: 2025-06-30